# Patient Record
Sex: FEMALE | Race: OTHER | NOT HISPANIC OR LATINO | URBAN - METROPOLITAN AREA
[De-identification: names, ages, dates, MRNs, and addresses within clinical notes are randomized per-mention and may not be internally consistent; named-entity substitution may affect disease eponyms.]

---

## 2021-05-23 ENCOUNTER — OUTPATIENT (OUTPATIENT)
Dept: OUTPATIENT SERVICES | Facility: HOSPITAL | Age: 41
LOS: 1 days | End: 2021-05-23
Payer: MEDICAID

## 2021-05-23 DIAGNOSIS — Z11.52 ENCOUNTER FOR SCREENING FOR COVID-19: ICD-10-CM

## 2021-05-23 LAB — SARS-COV-2 RNA SPEC QL NAA+PROBE: SIGNIFICANT CHANGE UP

## 2021-05-23 PROCEDURE — 87635 SARS-COV-2 COVID-19 AMP PRB: CPT

## 2024-04-10 PROBLEM — Z00.00 ENCOUNTER FOR PREVENTIVE HEALTH EXAMINATION: Status: ACTIVE | Noted: 2024-04-10

## 2024-04-11 ENCOUNTER — APPOINTMENT (OUTPATIENT)
Dept: ORTHOPEDIC SURGERY | Facility: CLINIC | Age: 44
End: 2024-04-11
Payer: MEDICAID

## 2024-04-11 ENCOUNTER — RESULT REVIEW (OUTPATIENT)
Age: 44
End: 2024-04-11

## 2024-04-11 ENCOUNTER — TRANSCRIPTION ENCOUNTER (OUTPATIENT)
Age: 44
End: 2024-04-11

## 2024-04-11 ENCOUNTER — OUTPATIENT (OUTPATIENT)
Dept: OUTPATIENT SERVICES | Facility: HOSPITAL | Age: 44
LOS: 1 days | End: 2024-04-11
Payer: MEDICAID

## 2024-04-11 ENCOUNTER — NON-APPOINTMENT (OUTPATIENT)
Age: 44
End: 2024-04-11

## 2024-04-11 VITALS
OXYGEN SATURATION: 98 % | DIASTOLIC BLOOD PRESSURE: 84 MMHG | SYSTOLIC BLOOD PRESSURE: 139 MMHG | HEART RATE: 89 BPM | HEIGHT: 59 IN | WEIGHT: 245 LBS | BODY MASS INDEX: 49.39 KG/M2

## 2024-04-11 DIAGNOSIS — M75.52 BURSITIS OF LEFT SHOULDER: ICD-10-CM

## 2024-04-11 DIAGNOSIS — M67.912 UNSPECIFIED DISORDER OF SYNOVIUM AND TENDON, LEFT SHOULDER: ICD-10-CM

## 2024-04-11 PROCEDURE — 73030 X-RAY EXAM OF SHOULDER: CPT | Mod: LT

## 2024-04-11 PROCEDURE — 99203 OFFICE O/P NEW LOW 30 MIN: CPT

## 2024-04-11 PROCEDURE — 73030 X-RAY EXAM OF SHOULDER: CPT

## 2024-04-11 PROCEDURE — 73030 X-RAY EXAM OF SHOULDER: CPT | Mod: 26,LT

## 2024-04-11 RX ORDER — MELOXICAM 15 MG/1
15 TABLET ORAL DAILY
Qty: 30 | Refills: 1 | Status: ACTIVE | COMMUNITY
Start: 2024-04-11 | End: 1900-01-01

## 2024-04-12 NOTE — HISTORY OF PRESENT ILLNESS
[de-identified] : Joo Chambers, 44 year old female presents as a new patient with LT shoulder pain. Pain extends from the the neck down the arm and she is unable to reach behind to her back.  She has also noted that she has decreased strength in the LT arm. She notes that pain worsens when she lays on the LT arm/shoulder.  No numbness or tingling down the LUE.  No neck pain.  PT is RHD.  Not currently employed. Does not consistently exercising.

## 2024-04-12 NOTE — ASSESSMENT
[FreeTextEntry1] : Joo Chambers, 44 year old femalewith left shoulder pain.  I discussed with the patient that their symptoms, signs, and imaging are most consistent with rotator cuff tendinopathy and subacromial bursitis.  We reviewed the natural history of this condition and treatment options. We agreed on the following plan:   Encouraged to continue home exercises per handout. Activity modification: start home exercises as listed for shoulder stretching and strengthening protocol   Recommend 150 min per week of moderate intensity aerobic activity Medication: meloxicam PRN prescription provided period  Imaging: consider US-guided CSI. If symptoms worsen. Follow up in 6-8 weeks.

## 2024-04-12 NOTE — ADDENDUM
[FreeTextEntry1] :  Documented by Janet Mcnamara acting as a scribe under Dr. Stacy Palomino. 04/11/2024

## 2024-04-12 NOTE — PHYSICAL EXAM
[de-identified] : General: Well-nourished, well-developed, alert, and in no acute distress. Head: Normocephalic. Eyes: Pupils equal, extraocular muscles intact, normal sclera. Nose: No nasal discharge. Cardiovascular: Extremities are warm and well perfused. Distal pulses are symmetric bilaterally. Respiratory: No labored breathing. Extremities: Sensation is intact distally bilaterally. Distal pulses are symmetric bilaterally Lymphatic: No regional lymphadenopathy, no lymphedema Neurologic: No focal deficits Skin: Normal skin color, texture, and turgor Psychiatric: Normal affect   MSK:   C-spine demonstrates normal lordosis, no tenderness to palpation midline, paraspinals, AROM Cervical facet loading negative Spurling's Compression negative   Examination of right shoulder shows no overlying skin changes or muscular atrophy. There is no tenderness to palpation over the AC joint, Bicipital groove, Trapezius   Range of motion shows forward elevation of [160], abduction [150], external rotation [40], and internal rotation to the [lumbar spine].  There is pain at the end range of motion.   Special Tests: Painful Arc negative Neer's negative Hawkin's negative Joe's positive Resisted ext rotation negative Lift-Off negative [place] negative Speed's negative   Examination of left shoulder shows no overlying skin changes or muscular atrophy. There is no tenderness to palpation over the AC joint, Bicipital groove. Mild TTP over upper trapezius.    Range of motion shows forward elevation of [160], abduction [140], external rotation [50], and internal rotation to the lower lumbar spine.  There is no pain at the end range of motion.   Special Tests: Painful Arc positive Neer's negative Hawkin's positive Joe's pain no laxity  Resisted ext rotation negative Lift-Off positie [place] positive Speed's positive   Sensation is intact to light touch over the axillary, musculocutaneous, median, radial, and ulnar nerve distributions bilaterally. Capillary refill is less than two seconds. Radial pulses 2+ equal bilaterally. Strength testing shows 5/5 abduction, 5/5 external rotation, 5/5 internal rotation, 5/5 biceps, 5/5 triceps. 5/5 wrist extension, 5/5 intrinsics. Reflexes 2+ biceps, brachioradialis. Bailey negative.

## 2024-04-12 NOTE — END OF VISIT
[FreeTextEntry3] :  Documented by Janet Mcnamara acting as a scribe for Dr. Stacy Palomino. 04/11/2024   All medical record entries made by the Scribe were at my, Dr. Stacy Palomino. direction and personally dictated by me on 04/11/2024. I have reviewed the chart and agree that the record accurately reflects my personal performance of the history, physical exam, assessment and plan. I have also personally directed, reviewed, and agreed with the chart.

## 2024-05-30 ENCOUNTER — APPOINTMENT (OUTPATIENT)
Dept: ORTHOPEDIC SURGERY | Facility: CLINIC | Age: 44
End: 2024-05-30

## 2024-06-01 NOTE — ASSESSMENT
[FreeTextEntry1] : VIJAY TRUJILLO is a 44 year old female with     I discussed with the patient that their symptoms, signs, and imaging are most consistent with  **.  We reviewed the natural history of this condition and treatment options. We agreed on the following plan:  Encouraged to continue home exercises per handout. Continue physical therapy. Recommend 150 min per week of moderate intensity aerobic activity  Medication:    prescription provided. Imaging: Follow up in 6-8 weeks.

## 2024-06-01 NOTE — HISTORY OF PRESENT ILLNESS
[de-identified] : VIJAY TRUJILLO is a 44 year old female presents to follow up Last visit was 04/11/24

## 2024-09-17 ENCOUNTER — APPOINTMENT (OUTPATIENT)
Dept: ENDOCRINOLOGY | Facility: CLINIC | Age: 44
End: 2024-09-17
Payer: MEDICAID

## 2024-09-17 VITALS
HEIGHT: 59 IN | HEART RATE: 103 BPM | BODY MASS INDEX: 46.57 KG/M2 | DIASTOLIC BLOOD PRESSURE: 100 MMHG | SYSTOLIC BLOOD PRESSURE: 157 MMHG | WEIGHT: 231 LBS

## 2024-09-17 DIAGNOSIS — E11.9 TYPE 2 DIABETES MELLITUS W/OUT COMPLICATIONS: ICD-10-CM

## 2024-09-17 LAB
GLUCOSE BLDC GLUCOMTR-MCNC: 306
HBA1C MFR BLD HPLC: 10.9

## 2024-09-17 PROCEDURE — 82962 GLUCOSE BLOOD TEST: CPT

## 2024-09-17 PROCEDURE — 83036 HEMOGLOBIN GLYCOSYLATED A1C: CPT | Mod: QW

## 2024-09-17 PROCEDURE — 99204 OFFICE O/P NEW MOD 45 MIN: CPT | Mod: 25

## 2024-09-17 RX ORDER — GLIMEPIRIDE 2 MG/1
2 TABLET ORAL
Qty: 30 | Refills: 5 | Status: ACTIVE | COMMUNITY
Start: 2024-09-17 | End: 1900-01-01

## 2024-09-17 RX ORDER — LANCETS
EACH MISCELLANEOUS
Qty: 100 | Refills: 1 | Status: ACTIVE | COMMUNITY
Start: 2024-09-17 | End: 1900-01-01

## 2024-09-17 RX ORDER — BLOOD-GLUCOSE METER
W/DEVICE KIT MISCELLANEOUS
Qty: 1 | Refills: 0 | Status: ACTIVE | COMMUNITY
Start: 2024-09-17 | End: 1900-01-01

## 2024-09-17 RX ORDER — METFORMIN HYDROCHLORIDE 500 MG/1
500 TABLET, COATED ORAL TWICE DAILY
Qty: 60 | Refills: 5 | Status: ACTIVE | COMMUNITY
Start: 2024-09-17 | End: 1900-01-01

## 2024-09-17 RX ORDER — BLOOD SUGAR DIAGNOSTIC
STRIP MISCELLANEOUS
Qty: 25 | Refills: 5 | Status: ACTIVE | COMMUNITY
Start: 2024-09-17 | End: 1900-01-01

## 2024-09-17 RX ORDER — SEMAGLUTIDE 1.34 MG/ML
4 INJECTION, SOLUTION SUBCUTANEOUS
Qty: 1 | Refills: 5 | Status: ACTIVE | COMMUNITY
Start: 2024-09-17 | End: 1900-01-01

## 2024-09-17 NOTE — PHYSICAL EXAM
[Alert] : alert [No Acute Distress] : no acute distress [No Proptosis] : no proptosis [No Lid Lag] : no lid lag [Normal Hearing] : hearing was normal [No LAD] : no lymphadenopathy [Thyroid Not Enlarged] : the thyroid was not enlarged [Clear to Auscultation] : lungs were clear to auscultation bilaterally [Normal S1, S2] : normal S1 and S2 [Regular Rhythm] : with a regular rhythm [No Edema] : no peripheral edema [Pedal Pulses Normal] : the pedal pulses are present [Normal Sensation on Monofilament Testing] : normal sensation on monofilament testing of lower extremities [Normal Affect] : the affect was normal [Normal Mood] : the mood was normal [Foot Ulcers] : no foot ulcers [de-identified] : glucose 306 [de-identified] : moderate acanthosis nigricans

## 2024-09-17 NOTE — ASSESSMENT
[FreeTextEntry1] : Diabetes, new diagnosis, not controlled. Obesity BMI 46. Potential complications of diabetes reviewed (blindness, kidney disease, nerve damage) and importance of glucose control discussed to prevent complications. Refer to nutritionist. Limit carb portions to one type of carb per meal and limiit serving to half cup .  Avoid  juice or sugared beverages.  Glucometer rx'd;  test glucose once/day, alternating between fasting (am goal < 130) and bedtime/post dinner (goal < 170). Foot care discussed.  Start metformin 500mg bid and glimepiride 2mg with breakfast to reduce sugars. I also recommended starting Ozempic 0.25mg/week, first dose given in office.  sample given (#1 pen).   Titrate to 0.5mg/week dose in 3 weeks and finish pen, and then titrate to 1mg/week dose (rx sent). I hope to discontinue glimepiride once her sugars are controlled. Exercise goal 30 min/day, or 4181-8010 steps/day. Refer for sleep study to evaluate for PORFIRIO. will order labs next visit RTO 3 months

## 2024-09-17 NOTE — HISTORY OF PRESENT ILLNESS
[FreeTextEntry1] : She was having polyuria and back pain, she went to urgent care (2 weeks ago) thinking she had a UTI, and glucose was over 300. Her father has diabetes, diagnosed in his 60s. She was having polyuria, polydipsia for 2months.  Also has nocturia and fatigue. Since that urgent care visit, she reduced intake of sugars, white bread, changed to plain yogurt and cottage cheese. Her feet have been feeling more sensitive recently.  No burning or numbness. She is aware that she snores and she has a hard time falling asleep. A1c today is 10.9%.  Glucose is 306.

## 2024-09-19 ENCOUNTER — APPOINTMENT (OUTPATIENT)
Dept: ENDOCRINOLOGY | Facility: CLINIC | Age: 44
End: 2024-09-19
Payer: MEDICAID

## 2024-09-19 ENCOUNTER — NON-APPOINTMENT (OUTPATIENT)
Age: 44
End: 2024-09-19

## 2024-09-19 PROCEDURE — G0108 DIAB MANAGE TRN  PER INDIV: CPT

## 2024-10-25 ENCOUNTER — APPOINTMENT (OUTPATIENT)
Dept: ENDOCRINOLOGY | Facility: CLINIC | Age: 44
End: 2024-10-25

## 2024-10-25 VITALS — HEIGHT: 59 IN | BODY MASS INDEX: 45.96 KG/M2 | WEIGHT: 228 LBS

## 2024-10-25 PROCEDURE — G0108 DIAB MANAGE TRN  PER INDIV: CPT | Mod: 95

## 2024-11-26 ENCOUNTER — APPOINTMENT (OUTPATIENT)
Dept: PULMONOLOGY | Facility: CLINIC | Age: 44
End: 2024-11-26

## 2024-12-17 ENCOUNTER — APPOINTMENT (OUTPATIENT)
Dept: ENDOCRINOLOGY | Facility: CLINIC | Age: 44
End: 2024-12-17

## 2024-12-27 ENCOUNTER — APPOINTMENT (OUTPATIENT)
Dept: ENDOCRINOLOGY | Facility: CLINIC | Age: 44
End: 2024-12-27

## 2025-01-13 ENCOUNTER — APPOINTMENT (OUTPATIENT)
Dept: ENDOCRINOLOGY | Facility: CLINIC | Age: 45
End: 2025-01-13

## 2025-03-11 ENCOUNTER — RX RENEWAL (OUTPATIENT)
Age: 45
End: 2025-03-11